# Patient Record
Sex: MALE | Race: WHITE | NOT HISPANIC OR LATINO | URBAN - METROPOLITAN AREA
[De-identification: names, ages, dates, MRNs, and addresses within clinical notes are randomized per-mention and may not be internally consistent; named-entity substitution may affect disease eponyms.]

---

## 2017-06-22 ENCOUNTER — OUTPATIENT (OUTPATIENT)
Dept: OUTPATIENT SERVICES | Facility: HOSPITAL | Age: 27
LOS: 1 days | Discharge: STILL ADMITTED OR ADMINISTRATIVE | End: 2017-06-22

## 2017-06-23 DIAGNOSIS — F32.1 MAJOR DEPRESSIVE DISORDER, SINGLE EPISODE, MODERATE: ICD-10-CM

## 2017-07-06 ENCOUNTER — INPATIENT (INPATIENT)
Facility: HOSPITAL | Age: 27
LOS: 7 days | Discharge: HOME | End: 2017-07-14
Attending: PSYCHIATRY & NEUROLOGY | Admitting: PSYCHIATRY & NEUROLOGY
Payer: COMMERCIAL

## 2017-07-06 VITALS
HEART RATE: 51 BPM | SYSTOLIC BLOOD PRESSURE: 114 MMHG | RESPIRATION RATE: 20 BRPM | TEMPERATURE: 98 F | OXYGEN SATURATION: 98 % | DIASTOLIC BLOOD PRESSURE: 64 MMHG

## 2017-07-06 DIAGNOSIS — F29 UNSPECIFIED PSYCHOSIS NOT DUE TO A SUBSTANCE OR KNOWN PHYSIOLOGICAL CONDITION: ICD-10-CM

## 2017-07-06 LAB
ALBUMIN SERPL ELPH-MCNC: 4.5 G/DL — SIGNIFICANT CHANGE UP (ref 3.3–5)
ALP SERPL-CCNC: 45 U/L — SIGNIFICANT CHANGE UP (ref 40–120)
ALT FLD-CCNC: 35 U/L — SIGNIFICANT CHANGE UP (ref 4–41)
AMPHET UR-MCNC: NEGATIVE — SIGNIFICANT CHANGE UP
APAP SERPL-MCNC: < 15 UG/ML — LOW (ref 15–25)
APPEARANCE UR: CLEAR — SIGNIFICANT CHANGE UP
AST SERPL-CCNC: 43 U/L — HIGH (ref 4–40)
BACTERIA # UR AUTO: SIGNIFICANT CHANGE UP
BARBITURATES MEASUREMENT: NEGATIVE — SIGNIFICANT CHANGE UP
BARBITURATES UR SCN-MCNC: NEGATIVE — SIGNIFICANT CHANGE UP
BASOPHILS # BLD AUTO: 0.05 K/UL — SIGNIFICANT CHANGE UP (ref 0–0.2)
BASOPHILS NFR BLD AUTO: 0.5 % — SIGNIFICANT CHANGE UP (ref 0–2)
BENZODIAZ SERPL-MCNC: NEGATIVE — SIGNIFICANT CHANGE UP
BENZODIAZ UR-MCNC: NEGATIVE — SIGNIFICANT CHANGE UP
BILIRUB SERPL-MCNC: 0.4 MG/DL — SIGNIFICANT CHANGE UP (ref 0.2–1.2)
BILIRUB UR-MCNC: NEGATIVE — SIGNIFICANT CHANGE UP
BLOOD UR QL VISUAL: NEGATIVE — SIGNIFICANT CHANGE UP
BUN SERPL-MCNC: 13 MG/DL — SIGNIFICANT CHANGE UP (ref 7–23)
CALCIUM SERPL-MCNC: 9.6 MG/DL — SIGNIFICANT CHANGE UP (ref 8.4–10.5)
CANNABINOIDS UR-MCNC: POSITIVE — SIGNIFICANT CHANGE UP
CHLORIDE SERPL-SCNC: 99 MMOL/L — SIGNIFICANT CHANGE UP (ref 98–107)
CO2 SERPL-SCNC: 28 MMOL/L — SIGNIFICANT CHANGE UP (ref 22–31)
COCAINE METAB.OTHER UR-MCNC: POSITIVE — SIGNIFICANT CHANGE UP
COLOR SPEC: YELLOW — SIGNIFICANT CHANGE UP
CREAT SERPL-MCNC: 1.12 MG/DL — SIGNIFICANT CHANGE UP (ref 0.5–1.3)
EOSINOPHIL # BLD AUTO: 0.18 K/UL — SIGNIFICANT CHANGE UP (ref 0–0.5)
EOSINOPHIL NFR BLD AUTO: 1.6 % — SIGNIFICANT CHANGE UP (ref 0–6)
ETHANOL BLD-MCNC: < 10 MG/DL — SIGNIFICANT CHANGE UP
GLUCOSE SERPL-MCNC: 103 MG/DL — HIGH (ref 70–99)
GLUCOSE UR-MCNC: NEGATIVE — SIGNIFICANT CHANGE UP
HCT VFR BLD CALC: 45.6 % — SIGNIFICANT CHANGE UP (ref 39–50)
HGB BLD-MCNC: 15.2 G/DL — SIGNIFICANT CHANGE UP (ref 13–17)
IMM GRANULOCYTES # BLD AUTO: 0.06 # — SIGNIFICANT CHANGE UP
IMM GRANULOCYTES NFR BLD AUTO: 0.5 % — SIGNIFICANT CHANGE UP (ref 0–1.5)
KETONES UR-MCNC: NEGATIVE — SIGNIFICANT CHANGE UP
LEUKOCYTE ESTERASE UR-ACNC: NEGATIVE — SIGNIFICANT CHANGE UP
LYMPHOCYTES # BLD AUTO: 2.64 K/UL — SIGNIFICANT CHANGE UP (ref 1–3.3)
LYMPHOCYTES # BLD AUTO: 23.8 % — SIGNIFICANT CHANGE UP (ref 13–44)
MCHC RBC-ENTMCNC: 29.8 PG — SIGNIFICANT CHANGE UP (ref 27–34)
MCHC RBC-ENTMCNC: 33.3 % — SIGNIFICANT CHANGE UP (ref 32–36)
MCV RBC AUTO: 89.4 FL — SIGNIFICANT CHANGE UP (ref 80–100)
METHADONE UR-MCNC: NEGATIVE — SIGNIFICANT CHANGE UP
MONOCYTES # BLD AUTO: 0.74 K/UL — SIGNIFICANT CHANGE UP (ref 0–0.9)
MONOCYTES NFR BLD AUTO: 6.7 % — SIGNIFICANT CHANGE UP (ref 2–14)
MUCOUS THREADS # UR AUTO: SIGNIFICANT CHANGE UP
NEUTROPHILS # BLD AUTO: 7.42 K/UL — HIGH (ref 1.8–7.4)
NEUTROPHILS NFR BLD AUTO: 66.9 % — SIGNIFICANT CHANGE UP (ref 43–77)
NITRITE UR-MCNC: NEGATIVE — SIGNIFICANT CHANGE UP
NRBC # FLD: 0 — SIGNIFICANT CHANGE UP
OPIATES UR-MCNC: NEGATIVE — SIGNIFICANT CHANGE UP
OXYCODONE UR-MCNC: NEGATIVE — SIGNIFICANT CHANGE UP
PCP UR-MCNC: NEGATIVE — SIGNIFICANT CHANGE UP
PH UR: 6 — SIGNIFICANT CHANGE UP (ref 4.6–8)
PLATELET # BLD AUTO: 176 K/UL — SIGNIFICANT CHANGE UP (ref 150–400)
PMV BLD: 10.9 FL — SIGNIFICANT CHANGE UP (ref 7–13)
POTASSIUM SERPL-MCNC: 3.8 MMOL/L — SIGNIFICANT CHANGE UP (ref 3.5–5.3)
POTASSIUM SERPL-SCNC: 3.8 MMOL/L — SIGNIFICANT CHANGE UP (ref 3.5–5.3)
PROT SERPL-MCNC: 7.4 G/DL — SIGNIFICANT CHANGE UP (ref 6–8.3)
PROT UR-MCNC: 10 — SIGNIFICANT CHANGE UP
RBC # BLD: 5.1 M/UL — SIGNIFICANT CHANGE UP (ref 4.2–5.8)
RBC # FLD: 12.5 % — SIGNIFICANT CHANGE UP (ref 10.3–14.5)
RBC CASTS # UR COMP ASSIST: SIGNIFICANT CHANGE UP (ref 0–?)
SALICYLATES SERPL-MCNC: < 5 MG/DL — LOW (ref 15–30)
SODIUM SERPL-SCNC: 141 MMOL/L — SIGNIFICANT CHANGE UP (ref 135–145)
SP GR SPEC: 1.03 — SIGNIFICANT CHANGE UP (ref 1–1.03)
TSH SERPL-MCNC: 1.18 UIU/ML — SIGNIFICANT CHANGE UP (ref 0.27–4.2)
UROBILINOGEN FLD QL: NORMAL E.U. — SIGNIFICANT CHANGE UP (ref 0.1–0.2)
WBC # BLD: 11.09 K/UL — HIGH (ref 3.8–10.5)
WBC # FLD AUTO: 11.09 K/UL — HIGH (ref 3.8–10.5)
WBC UR QL: SIGNIFICANT CHANGE UP (ref 0–?)

## 2017-07-06 PROCEDURE — 99285 EMERGENCY DEPT VISIT HI MDM: CPT

## 2017-07-06 RX ORDER — DIPHENHYDRAMINE HCL 50 MG
50 CAPSULE ORAL EVERY 6 HOURS
Qty: 0 | Refills: 0 | Status: DISCONTINUED | OUTPATIENT
Start: 2017-07-06 | End: 2017-07-14

## 2017-07-06 RX ORDER — HALOPERIDOL DECANOATE 100 MG/ML
5 INJECTION INTRAMUSCULAR ONCE
Qty: 0 | Refills: 0 | Status: DISCONTINUED | OUTPATIENT
Start: 2017-07-06 | End: 2017-07-14

## 2017-07-06 RX ORDER — DIPHENHYDRAMINE HCL 50 MG
50 CAPSULE ORAL ONCE
Qty: 0 | Refills: 0 | Status: DISCONTINUED | OUTPATIENT
Start: 2017-07-06 | End: 2017-07-14

## 2017-07-06 RX ORDER — HALOPERIDOL DECANOATE 100 MG/ML
5 INJECTION INTRAMUSCULAR EVERY 6 HOURS
Qty: 0 | Refills: 0 | Status: DISCONTINUED | OUTPATIENT
Start: 2017-07-06 | End: 2017-07-14

## 2017-07-06 RX ORDER — RISPERIDONE 4 MG/1
1 TABLET ORAL AT BEDTIME
Qty: 0 | Refills: 0 | Status: DISCONTINUED | OUTPATIENT
Start: 2017-07-06 | End: 2017-07-10

## 2017-07-06 RX ORDER — ESCITALOPRAM OXALATE 10 MG/1
10 TABLET, FILM COATED ORAL DAILY
Qty: 0 | Refills: 0 | Status: DISCONTINUED | OUTPATIENT
Start: 2017-07-06 | End: 2017-07-07

## 2017-07-06 RX ADMIN — Medication 2 MILLIGRAM(S): at 22:57

## 2017-07-06 RX ADMIN — RISPERIDONE 1 MILLIGRAM(S): 4 TABLET ORAL at 22:57

## 2017-07-06 NOTE — ED BEHAVIORAL HEALTH ASSESSMENT NOTE - HPI (INCLUDE ILLNESS QUALITY, SEVERITY, DURATION, TIMING, CONTEXT, MODIFYING FACTORS, ASSOCIATED SIGNS AND SYMPTOMS)
Patient is a 26 year old single man, with no children, currently unemployed and domiciled with his parents and brother in a private residence, recently accepted to graduate school, with no PMH, with prior psychiatric history of depression, no prior hospitalization, no prior suicide attempts, with prior substance use (marijuana, LSD, stimulants, alcohol), with no prior violence or legal problems, with no family history, was referred to ED and brought in by mother for psychotic symptoms.    Patient presents as a limited historian, tangential, with some impaired reasoning, mildly disorganized, reports to have been brought in by mother because of argument that occurred earlier this morning. Patient unable to provide information regarding reason for argument, stating that mother is "overreacting, adding "I'm fine." Reports to have been yelling, however denying aggression or violence with mother. Denies making homicidal threats. Denies homicidal ideation/intent/plan. Reports to have been stressed over employment matter and "poor decisions" he made in that regard. Reports turning down a good employment opportunity for a start up in [place] and now he is regretting it. Reports the offer is "no longer on the table," as it was last year. Reports to have been in [place] for 9 months and recently returned 01/2017. 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 Reports spending time at home "thinking about what to do next." Reports "multiple rejections from sales opportunities on [employer]." Reports putting pressure on self, leading to feelings of anxiety, excessive worrying about future and "what to do." Reports preservative thoughts about missed opportunities. Reports varying depressed mood, deny persistency. Reports periods of anhedonia, hopelessness, helplessness, worthlessness, however denying persistency of these symptoms. Reports difficulty concentration. Reports low energy and low motivation. Denies disturbances in sleep / appetite. Reports prior passive suicidal ideation of "rather being dead" however denies suicidal ideation/intent/plan. Denies manic symptoms including elevated mood, increased irritability, mood lability, distractibility, pressured speech, increase in goal-directed activity, or decreased need for sleep. Reports psychotic symptoms including auditory hallucinations, however could not specify content of auditory hallucination, reporting it usually experiencing it in the evening hours. Denies  paranoia, ideas of reference, thought insertion/broadcasting, or  visual/olfactory/tactile/gustatory hallucinations. Reports decline in overall function.     Collateral provided by mother, adding that patient has been acting more bizarre at home, talking to self (loudly), not making sense, perseverating on lost opportunities. Reports patient has been having delusions of grandeur, talking about himself being a top candidate for a company on [employer], however at this time, patient is disorganized, cannot care for self, let alone make appropriate decisions, with poor insight to his symptoms and having limited judgement. Patient's condition has been worsening, although recently starting treatment with Dr. Rollins at [facility] and is supposed to start Early Intervention Program 07/13/2017. Patient is medication compliance with Lexapro 10 mg daily and Risperidone 5 mg. Reports patients increased disorganized state and worsening of bizarre nonsensical behaviors is concerning, requesting in-patient hospitalization for safety and stabilization. Patient is a 26 year old single man, with no children, currently unemployed and domiciled with his parents and brother in a private residence, recently accepted to graduate school, with no PMH, with prior psychiatric history of depression and anxiety, no prior hospitalization, no prior suicide attempts, with prior substance use (marijuana, LSD, stimulants, alcohol), with no prior violence or legal problems, with no family history, was referred to ED and brought in by mother for psychotic symptoms.    Patient presents as a limited historian, tangential, with some impaired reasoning, mildly disorganized, reports to have been brought in by mother because of argument that occurred earlier this morning. Patient unable to provide information regarding reason for argument, stating that mother is "overreacting, adding "I'm fine." Reports to have been yelling, however denying aggression or violence with mother. Denies making homicidal threats. Denies homicidal ideation/intent/plan. Reports to have been stressed over employment matter and "poor decisions" he made in that regard. Reports turning down a good employment opportunity for a start up in [place] and now he is regretting it. Reports the offer is "no longer on the table," as it was last year. Reports to have been in [place] for 9 months and recently returned 01/2017. 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 Reports spending time at home "thinking about what to do next." Reports "multiple rejections from sales opportunities on [employer]." Reports putting pressure on self, leading to feelings of anxiety, excessive worrying about future and "what to do." Reports preservative thoughts about missed opportunities. Reports varying depressed mood, deny persistency. Reports periods of anhedonia, hopelessness, helplessness, worthlessness, however denying persistency of these symptoms. Reports difficulty concentration. Reports low energy and low motivation. Denies disturbances in sleep / appetite. Reports prior passive suicidal ideation of "rather being dead" however denies suicidal ideation/intent/plan. Denies manic symptoms including elevated mood, increased irritability, mood lability, distractibility, pressured speech, increase in goal-directed activity, or decreased need for sleep. Reports psychotic symptoms including auditory hallucinations, however could not specify content of auditory hallucination, reporting it usually experiencing it in the evening hours. Denies  paranoia, ideas of reference, thought insertion/broadcasting, or  visual/olfactory/tactile/gustatory hallucinations. Reports decline in overall function. Reports future orientation - wanting to find employment of go to graduate school.    Collateral provided by mother, adding that patient has been acting more bizarre at home, talking to self (loudly), not making sense, perseverating on lost opportunities. Patient has been complaining of hearing voices over the last few weeks, however does not believe to be commanding in nature. Reports patient has been having delusions of grandeur, talking about himself being a top candidate for a company on [employer], however at this time, patient is disorganized, cannot care for self, let alone make appropriate decisions, with poor insight to his symptoms and having limited judgement. Denies prior suicide attempt. Reports prior suicidal statement however denies patient having conviction in those statements. Denies prior self-injurious behaviors. Denies prior in-patient hospitalization. Patient's condition has been worsening, although recently starting treatment with Dr. Rollins at [facility] and is supposed to start Early Intervention Program 07/13/2017. Patient is medication compliant with Lexapro 10 mg daily and Risperidone 5 mg. Reports patients increased disorganized state and worsening of bizarre nonsensical behaviors is concerning, requesting in-patient hospitalization for safety and stabilization. Patient is a 26 year old single man, with no children, currently unemployed and domiciled with his parents and brother in a private residence, recently accepted to graduate school, with no PMH, with prior psychiatric history of depression and anxiety, no prior hospitalization, no prior suicide attempts, with prior substance use (marijuana, LSD, stimulants, alcohol), with no prior violence or legal problems, with no family history, was referred to ED and brought in by mother for psychotic symptoms.    Patient presents as a limited historian, tangential, with some impaired reasoning, mildly disorganized, reports to have been brought in by mother because of argument that occurred earlier this morning. Patient unable to provide information regarding reason for argument, stating that mother is "overreacting, adding "I'm fine." Reports to have been yelling, however denying aggression or violence with mother. Denies making homicidal threats. Denies homicidal ideation/intent/plan. Reports to have been stressed over employment matter and "poor decisions" he made in that regard. Reports turning down a good employment opportunity for a start up in [place] and now he is regretting it. Reports the offer is "no longer on the table," as it was last year. Reports to have been in [place] for 9 months and recently returned 01/2017. 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 Reports spending time at home "thinking about what to do next." Reports "multiple rejections from sales opportunities on [employer]." Reports putting pressure on self, leading to feelings of anxiety, excessive worrying about future and "what to do." Reports preservative thoughts about missed opportunities. Reports varying depressed mood, deny persistency. Reports periods of anhedonia, hopelessness, helplessness, worthlessness, however denying persistency of these symptoms. Reports difficulty concentration. Reports low energy and low motivation. Denies disturbances in sleep / appetite. Reports prior passive suicidal ideation of "rather being dead" however denies suicidal ideation/intent/plan. Denies manic symptoms including elevated mood, increased irritability, mood lability, distractibility, pressured speech, increase in goal-directed activity, or decreased need for sleep. Reports psychotic symptoms including auditory hallucinations, however could not specify content of auditory hallucination, reporting it usually experiencing it in the evening hours. Denies  paranoia, ideas of reference, thought insertion/broadcasting, or  visual/olfactory/tactile/gustatory hallucinations. Reports decline in overall function. Reports future orientation - wanting to find employment of go to graduate school.    Collateral provided by mother, adding that patient has been acting more bizarre at home, talking to self (loudly), not making sense, perseverating on lost opportunities. Patient has been complaining of hearing voices over the last few weeks, however does not believe to be commanding in nature. Reports patient has been having delusions of grandeur, talking about himself being a top candidate for a company on [employer], however at this time, patient is disorganized, cannot care for self, let alone make appropriate decisions, with poor insight to his symptoms and having limited judgement. Denies prior suicide attempt. Reports prior suicidal statement however denies patient having conviction in those statements. Denies prior self-injurious behaviors. Denies prior in-patient hospitalization. Patient's condition has been worsening, although recently starting treatment with Dr. Rollins at [facility] and is supposed to start Early Intervention Program 07/13/2017. Patient is medication compliant with Lexapro 10 mg daily and Risperidone 1 mg. Reports patients increased disorganized state and worsening of bizarre nonsensical behaviors is concerning, requesting in-patient hospitalization for safety and stabilization.

## 2017-07-06 NOTE — ED PROVIDER NOTE - CHPI ED SYMPTOMS NEG
no agitation/no homicidal/no hallucinations/no confusion/no weight loss/no disorientation/no suicidal/no weakness/no change in level of consciousness/no paranoia

## 2017-07-06 NOTE — ED BEHAVIORAL HEALTH ASSESSMENT NOTE - UNABLE TO CARE FOR SELF DETAILS
poor insight, limited judgement, psychotic symptoms with disorganized thought process and intermittent experiences auditory hallucination

## 2017-07-06 NOTE — ED BEHAVIORAL HEALTH ASSESSMENT NOTE - DESCRIPTION (FIRST USE, LAST USE, QUANTITY, FREQUENCY, DURATION)
LSD (age 22)- had psychotic episode and was sent to ED; Stimulant (age 23) - got it from friend at last year of undergraduate college occasional use

## 2017-07-06 NOTE — ED BEHAVIORAL HEALTH ASSESSMENT NOTE - OTHER
mother auditory hallucinations mildly disorganized mild looseness / derailment delusions of grandeur recent history of auditory hallucination however denying current auditory hallucination

## 2017-07-06 NOTE — ED PROVIDER NOTE - MEDICAL DECISION MAKING DETAILS
25y/o Male hx of anxiety and depression brought to ed for psych eval 2/2 delusion as per his parents.  Pt is well appearing w/o visible signs of physical injuries on exam, head NCAT, no C-spine tend, CN II- XII intact, ambulating w/ steady gait, pulm- bl cta, CV- S1S2- GI- abd sft, nt,nd,no rebound or guarding-  labs and ECG pending for medical clearance. 25y/o Male hx of anxiety and depression brought to ed for psych eval 2/2 delusion as per his parents.  Pt is well appearing and robust habitus,  w/o visible signs of physical injuries on exam, head NCAT, no C-spine tend, CN II- XII intact, ambulating w/ steady gait, pulm- bl cta, CV- S1S2- GI- abd sft, nt,nd,no rebound or guarding-  labs and ECG pending for medical clearance.  labs unrevealing, sinus amando at 47 w/o cp or sob, no dizziness or lightheadedness- Usually runs 6 miles/10 minutes.  Medically clear for psych eval 25y/o Male hx of anxiety and depression brought to ed for psych eval 2/2 delusion as per his parents.  Pt is well appearing and robust habitus,  w/o visible signs of physical injuries on exam, head NCAT, no C-spine tend, CN II- XII intact, ambulating w/ steady gait, pulm- bl cta, CV- S1S2- GI- abd sft, nt,nd,no rebound or guarding-  labs and ECG pending for medical clearance.  labs unrevealing, sinus amando at 47 w/o cp or sob, no dizziness or lightheadedness- Usually runs 6 miles/hr.  Medically cleared for psych disposition.
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
## 2017-07-06 NOTE — ED BEHAVIORAL HEALTH ASSESSMENT NOTE - DESCRIPTION
Patient was calm and cooperative in the ED and did not exhibit any aggression. Patient did not require any PRN medications or any physical restraints. Denies As per HPI

## 2017-07-06 NOTE — ED BEHAVIORAL HEALTH ASSESSMENT NOTE - SUMMARY
Patient is a 26 year old single man, with no children, currently unemployed and domiciled with his parents and brother in a private residence, recently accepted to graduate school, with no PMH, with prior psychiatric history of depression and anxiety, no prior hospitalization, no prior suicide attempts, with prior substance use (marijuana, LSD, stimulants, alcohol), with no prior violence or legal problems, with no family history, was referred to ED and brought in by mother for psychotic symptoms.    Patient presents with psychotic symptoms that are worsening, representing a change from baseline from which the patient cannot be reasonably expected to improve with current level of care. The patient presents with risk requiring inpatient psychiatric hospitalization for safety and stabilization.

## 2017-07-06 NOTE — ED PROVIDER NOTE - OBJECTIVE STATEMENT
The patient is a 26y Male hx of anxiety and depression brought to ed for psych eval 2/2 delusion as per his parents.  Pt denies recent injuries, trauma or falls, no headache, back or neck pain, no abd/flank pain, no uti/urinary symptoms, nausea or vomiting, no fever or chills, no cp or sob, no palpitations or diaphoresis.  Pt reported smoking pot and had a few beers for 4th of July.  Denies SI/HI, no AVH.

## 2017-07-06 NOTE — ED BEHAVIORAL HEALTH ASSESSMENT NOTE - PSYCHIATRIC ISSUES AND PLAN (INCLUDE STANDING AND PRN MEDICATION)
Haldol 5 mg PO/IM, Ativan 2 mg PO/IM, and Benadryl 50 mg PO/IM PRN Q6H for agitation. Lexapro 10 mg once daily. Risperidone 1 mg at bedtime. Haldol 5 mg PO/IM, Ativan 2 mg PO/IM, and Benadryl 50 mg PO/IM PRN Q6H for agitation.

## 2017-07-06 NOTE — ED BEHAVIORAL HEALTH ASSESSMENT NOTE - RISK ASSESSMENT
Patient presents a risk for harm to self, with risk factors including poor insight, poor judgement, prior substance use, mood lability, decline in overall function, unable to care for self and make appropriate decisions, delusions of grandeur, disorganized, of which protective factors of supportive family, no current suicidal ideation are not sufficient barriers to patient's self harm, summating patient as a significant risk for harm, regarding inability to care for self, requiring in-patient hospitalization for safety and stabilization.

## 2017-07-06 NOTE — ED BEHAVIORAL HEALTH ASSESSMENT NOTE - DETAILS
prior fleeting passive suicidal ideation with no prior intent / plan. No prior suicide attempt. JULIUS Mother notified JULIUS Andrade

## 2017-07-07 PROCEDURE — 99223 1ST HOSP IP/OBS HIGH 75: CPT

## 2017-07-07 RX ORDER — HYDROXYZINE HCL 10 MG
50 TABLET ORAL EVERY 6 HOURS
Qty: 0 | Refills: 0 | Status: DISCONTINUED | OUTPATIENT
Start: 2017-07-07 | End: 2017-07-07

## 2017-07-07 RX ORDER — ESCITALOPRAM OXALATE 10 MG/1
5 TABLET, FILM COATED ORAL DAILY
Qty: 0 | Refills: 0 | Status: DISCONTINUED | OUTPATIENT
Start: 2017-07-07 | End: 2017-07-08

## 2017-07-07 RX ORDER — HYDROXYZINE HCL 10 MG
50 TABLET ORAL EVERY 6 HOURS
Qty: 0 | Refills: 0 | Status: DISCONTINUED | OUTPATIENT
Start: 2017-07-07 | End: 2017-07-14

## 2017-07-07 RX ORDER — MIRTAZAPINE 45 MG/1
15 TABLET, ORALLY DISINTEGRATING ORAL AT BEDTIME
Qty: 0 | Refills: 0 | Status: DISCONTINUED | OUTPATIENT
Start: 2017-07-07 | End: 2017-07-08

## 2017-07-07 RX ADMIN — ESCITALOPRAM OXALATE 10 MILLIGRAM(S): 10 TABLET, FILM COATED ORAL at 08:48

## 2017-07-07 RX ADMIN — MIRTAZAPINE 15 MILLIGRAM(S): 45 TABLET, ORALLY DISINTEGRATING ORAL at 21:00

## 2017-07-07 RX ADMIN — RISPERIDONE 1 MILLIGRAM(S): 4 TABLET ORAL at 21:00

## 2017-07-08 PROCEDURE — 99232 SBSQ HOSP IP/OBS MODERATE 35: CPT

## 2017-07-08 RX ORDER — MIRTAZAPINE 45 MG/1
30 TABLET, ORALLY DISINTEGRATING ORAL AT BEDTIME
Qty: 0 | Refills: 0 | Status: DISCONTINUED | OUTPATIENT
Start: 2017-07-08 | End: 2017-07-14

## 2017-07-08 RX ADMIN — ESCITALOPRAM OXALATE 5 MILLIGRAM(S): 10 TABLET, FILM COATED ORAL at 08:38

## 2017-07-08 RX ADMIN — MIRTAZAPINE 30 MILLIGRAM(S): 45 TABLET, ORALLY DISINTEGRATING ORAL at 20:38

## 2017-07-08 RX ADMIN — RISPERIDONE 1 MILLIGRAM(S): 4 TABLET ORAL at 20:54

## 2017-07-09 PROCEDURE — 99232 SBSQ HOSP IP/OBS MODERATE 35: CPT

## 2017-07-09 RX ADMIN — MIRTAZAPINE 30 MILLIGRAM(S): 45 TABLET, ORALLY DISINTEGRATING ORAL at 20:21

## 2017-07-09 RX ADMIN — RISPERIDONE 1 MILLIGRAM(S): 4 TABLET ORAL at 20:21

## 2017-07-09 RX ADMIN — Medication 50 MILLIGRAM(S): at 20:22

## 2017-07-10 PROCEDURE — 99232 SBSQ HOSP IP/OBS MODERATE 35: CPT

## 2017-07-10 RX ORDER — RISPERIDONE 4 MG/1
2 TABLET ORAL AT BEDTIME
Qty: 0 | Refills: 0 | Status: DISCONTINUED | OUTPATIENT
Start: 2017-07-10 | End: 2017-07-13

## 2017-07-10 RX ADMIN — MIRTAZAPINE 30 MILLIGRAM(S): 45 TABLET, ORALLY DISINTEGRATING ORAL at 20:32

## 2017-07-10 RX ADMIN — RISPERIDONE 2 MILLIGRAM(S): 4 TABLET ORAL at 20:32

## 2017-07-11 PROCEDURE — 99232 SBSQ HOSP IP/OBS MODERATE 35: CPT | Mod: 25

## 2017-07-11 RX ADMIN — MIRTAZAPINE 30 MILLIGRAM(S): 45 TABLET, ORALLY DISINTEGRATING ORAL at 20:54

## 2017-07-11 RX ADMIN — RISPERIDONE 2 MILLIGRAM(S): 4 TABLET ORAL at 20:54

## 2017-07-12 PROCEDURE — 90853 GROUP PSYCHOTHERAPY: CPT

## 2017-07-12 RX ORDER — BUPROPION HYDROCHLORIDE 150 MG/1
150 TABLET, EXTENDED RELEASE ORAL DAILY
Qty: 0 | Refills: 0 | Status: DISCONTINUED | OUTPATIENT
Start: 2017-07-12 | End: 2017-07-14

## 2017-07-12 RX ADMIN — MIRTAZAPINE 30 MILLIGRAM(S): 45 TABLET, ORALLY DISINTEGRATING ORAL at 20:33

## 2017-07-12 RX ADMIN — RISPERIDONE 2 MILLIGRAM(S): 4 TABLET ORAL at 20:33

## 2017-07-13 PROCEDURE — 99232 SBSQ HOSP IP/OBS MODERATE 35: CPT

## 2017-07-13 RX ORDER — BUPROPION HYDROCHLORIDE 150 MG/1
2 TABLET, EXTENDED RELEASE ORAL
Qty: 30 | Refills: 0 | COMMUNITY
Start: 2017-07-13 | End: 2017-08-12

## 2017-07-13 RX ORDER — MIRTAZAPINE 45 MG/1
1 TABLET, ORALLY DISINTEGRATING ORAL
Qty: 30 | Refills: 0 | OUTPATIENT
Start: 2017-07-13 | End: 2017-08-12

## 2017-07-13 RX ORDER — RISPERIDONE 4 MG/1
2 TABLET ORAL AT BEDTIME
Qty: 0 | Refills: 0 | Status: DISCONTINUED | OUTPATIENT
Start: 2017-07-13 | End: 2017-07-14

## 2017-07-13 RX ORDER — BUPROPION HYDROCHLORIDE 150 MG/1
1 TABLET, EXTENDED RELEASE ORAL
Qty: 30 | Refills: 0 | OUTPATIENT
Start: 2017-07-13 | End: 2017-08-12

## 2017-07-13 RX ORDER — RISPERIDONE 4 MG/1
1 TABLET ORAL
Qty: 0 | Refills: 0 | COMMUNITY

## 2017-07-13 RX ORDER — RISPERIDONE 4 MG/1
1.5 TABLET ORAL
Qty: 30 | Refills: 0 | COMMUNITY
Start: 2017-07-13 | End: 2017-08-12

## 2017-07-13 RX ORDER — RISPERIDONE 4 MG/1
2.5 TABLET ORAL AT BEDTIME
Qty: 0 | Refills: 0 | Status: DISCONTINUED | OUTPATIENT
Start: 2017-07-13 | End: 2017-07-13

## 2017-07-13 RX ORDER — ESCITALOPRAM OXALATE 10 MG/1
1 TABLET, FILM COATED ORAL
Qty: 0 | Refills: 0 | COMMUNITY

## 2017-07-13 RX ORDER — RISPERIDONE 4 MG/1
1 TABLET ORAL
Qty: 30 | Refills: 0 | OUTPATIENT
Start: 2017-07-13 | End: 2017-08-12

## 2017-07-13 RX ADMIN — BUPROPION HYDROCHLORIDE 150 MILLIGRAM(S): 150 TABLET, EXTENDED RELEASE ORAL at 09:54

## 2017-07-13 RX ADMIN — MIRTAZAPINE 30 MILLIGRAM(S): 45 TABLET, ORALLY DISINTEGRATING ORAL at 20:38

## 2017-07-13 RX ADMIN — RISPERIDONE 2 MILLIGRAM(S): 4 TABLET ORAL at 20:38

## 2017-07-14 VITALS — DIASTOLIC BLOOD PRESSURE: 61 MMHG | TEMPERATURE: 98 F | SYSTOLIC BLOOD PRESSURE: 134 MMHG | HEART RATE: 67 BPM

## 2017-07-14 RX ADMIN — BUPROPION HYDROCHLORIDE 150 MILLIGRAM(S): 150 TABLET, EXTENDED RELEASE ORAL at 10:05

## 2017-07-17 ENCOUNTER — OUTPATIENT (OUTPATIENT)
Dept: OUTPATIENT SERVICES | Facility: HOSPITAL | Age: 27
LOS: 1 days | Discharge: HOME | End: 2017-07-17

## 2017-07-18 DIAGNOSIS — F33.3 MAJOR DEPRESSIVE DISORDER, RECURRENT, SEVERE WITH PSYCHOTIC SYMPTOMS: ICD-10-CM

## 2017-08-11 ENCOUNTER — EMERGENCY (EMERGENCY)
Facility: HOSPITAL | Age: 27
LOS: 1 days | Discharge: HOME | End: 2017-08-11
Admitting: EMERGENCY MEDICINE
Payer: COMMERCIAL

## 2017-08-11 VITALS
OXYGEN SATURATION: 100 % | SYSTOLIC BLOOD PRESSURE: 109 MMHG | RESPIRATION RATE: 18 BRPM | TEMPERATURE: 97 F | DIASTOLIC BLOOD PRESSURE: 76 MMHG | HEART RATE: 90 BPM

## 2017-08-11 PROCEDURE — 99284 EMERGENCY DEPT VISIT MOD MDM: CPT
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
## 2017-08-11 NOTE — ED PROVIDER NOTE - MEDICAL DECISION MAKING DETAILS
25 y/o M hx Bipolar   No evidence of physical injuries, broken skin or deformities.  Medical evaluation performed. There is no clinical evidence of intoxication or any acute medical problem requiring immediate intervention. To follow up with [facility] out-patient clinic
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
## 2017-08-12 PROBLEM — F41.9 ANXIETY DISORDER, UNSPECIFIED: Chronic | Status: ACTIVE | Noted: 2017-07-06

## 2017-08-17 ENCOUNTER — INPATIENT (INPATIENT)
Facility: HOSPITAL | Age: 27
LOS: 7 days | Discharge: HOME | End: 2017-08-25
Attending: PSYCHIATRY & NEUROLOGY | Admitting: PSYCHIATRY & NEUROLOGY
Payer: COMMERCIAL

## 2017-08-17 VITALS — HEIGHT: 74 IN | TEMPERATURE: 206 F | WEIGHT: 214.29 LBS | HEART RATE: 70 BPM | RESPIRATION RATE: 18 BRPM

## 2017-08-17 DIAGNOSIS — F32.9 MAJOR DEPRESSIVE DISORDER, SINGLE EPISODE, UNSPECIFIED: ICD-10-CM

## 2017-08-17 LAB
ALBUMIN SERPL ELPH-MCNC: 4.6 G/DL — SIGNIFICANT CHANGE UP (ref 3.3–5)
ALP SERPL-CCNC: 47 U/L — SIGNIFICANT CHANGE UP (ref 40–120)
ALT FLD-CCNC: 25 U/L — SIGNIFICANT CHANGE UP (ref 4–41)
AMPHET UR-MCNC: NEGATIVE — SIGNIFICANT CHANGE UP
APAP SERPL-MCNC: < 15 UG/ML — LOW (ref 15–25)
APPEARANCE UR: CLEAR — SIGNIFICANT CHANGE UP
AST SERPL-CCNC: 24 U/L — SIGNIFICANT CHANGE UP (ref 4–40)
BARBITURATES MEASUREMENT: NEGATIVE — SIGNIFICANT CHANGE UP
BARBITURATES UR SCN-MCNC: NEGATIVE — SIGNIFICANT CHANGE UP
BASOPHILS # BLD AUTO: 0.04 K/UL — SIGNIFICANT CHANGE UP (ref 0–0.2)
BASOPHILS NFR BLD AUTO: 0.4 % — SIGNIFICANT CHANGE UP (ref 0–2)
BENZODIAZ SERPL-MCNC: NEGATIVE — SIGNIFICANT CHANGE UP
BENZODIAZ UR-MCNC: NEGATIVE — SIGNIFICANT CHANGE UP
BILIRUB SERPL-MCNC: 0.3 MG/DL — SIGNIFICANT CHANGE UP (ref 0.2–1.2)
BILIRUB UR-MCNC: NEGATIVE — SIGNIFICANT CHANGE UP
BLOOD UR QL VISUAL: NEGATIVE — SIGNIFICANT CHANGE UP
BUN SERPL-MCNC: 11 MG/DL — SIGNIFICANT CHANGE UP (ref 7–23)
CALCIUM SERPL-MCNC: 9.6 MG/DL — SIGNIFICANT CHANGE UP (ref 8.4–10.5)
CANNABINOIDS UR-MCNC: POSITIVE — SIGNIFICANT CHANGE UP
CHLORIDE SERPL-SCNC: 103 MMOL/L — SIGNIFICANT CHANGE UP (ref 98–107)
CO2 SERPL-SCNC: 28 MMOL/L — SIGNIFICANT CHANGE UP (ref 22–31)
COCAINE METAB.OTHER UR-MCNC: NEGATIVE — SIGNIFICANT CHANGE UP
COLOR SPEC: YELLOW — SIGNIFICANT CHANGE UP
CREAT SERPL-MCNC: 1.22 MG/DL — SIGNIFICANT CHANGE UP (ref 0.5–1.3)
EOSINOPHIL # BLD AUTO: 0.27 K/UL — SIGNIFICANT CHANGE UP (ref 0–0.5)
EOSINOPHIL NFR BLD AUTO: 2.4 % — SIGNIFICANT CHANGE UP (ref 0–6)
ETHANOL BLD-MCNC: < 10 MG/DL — SIGNIFICANT CHANGE UP
GLUCOSE SERPL-MCNC: 79 MG/DL — SIGNIFICANT CHANGE UP (ref 70–99)
GLUCOSE UR-MCNC: NEGATIVE — SIGNIFICANT CHANGE UP
HCT VFR BLD CALC: 46.5 % — SIGNIFICANT CHANGE UP (ref 39–50)
HGB BLD-MCNC: 15.3 G/DL — SIGNIFICANT CHANGE UP (ref 13–17)
HYALINE CASTS # UR AUTO: SIGNIFICANT CHANGE UP (ref 0–?)
IMM GRANULOCYTES # BLD AUTO: 0.03 # — SIGNIFICANT CHANGE UP
IMM GRANULOCYTES NFR BLD AUTO: 0.3 % — SIGNIFICANT CHANGE UP (ref 0–1.5)
KETONES UR-MCNC: NEGATIVE — SIGNIFICANT CHANGE UP
LEUKOCYTE ESTERASE UR-ACNC: NEGATIVE — SIGNIFICANT CHANGE UP
LYMPHOCYTES # BLD AUTO: 2.46 K/UL — SIGNIFICANT CHANGE UP (ref 1–3.3)
LYMPHOCYTES # BLD AUTO: 22.2 % — SIGNIFICANT CHANGE UP (ref 13–44)
MCHC RBC-ENTMCNC: 29.4 PG — SIGNIFICANT CHANGE UP (ref 27–34)
MCHC RBC-ENTMCNC: 32.9 % — SIGNIFICANT CHANGE UP (ref 32–36)
MCV RBC AUTO: 89.3 FL — SIGNIFICANT CHANGE UP (ref 80–100)
METHADONE UR-MCNC: NEGATIVE — SIGNIFICANT CHANGE UP
MONOCYTES # BLD AUTO: 0.72 K/UL — SIGNIFICANT CHANGE UP (ref 0–0.9)
MONOCYTES NFR BLD AUTO: 6.5 % — SIGNIFICANT CHANGE UP (ref 2–14)
MUCOUS THREADS # UR AUTO: SIGNIFICANT CHANGE UP
NEUTROPHILS # BLD AUTO: 7.54 K/UL — HIGH (ref 1.8–7.4)
NEUTROPHILS NFR BLD AUTO: 68.2 % — SIGNIFICANT CHANGE UP (ref 43–77)
NITRITE UR-MCNC: NEGATIVE — SIGNIFICANT CHANGE UP
NRBC # FLD: 0 — SIGNIFICANT CHANGE UP
OPIATES UR-MCNC: NEGATIVE — SIGNIFICANT CHANGE UP
OXYCODONE UR-MCNC: NEGATIVE — SIGNIFICANT CHANGE UP
PCP UR-MCNC: NEGATIVE — SIGNIFICANT CHANGE UP
PH UR: 6.5 — SIGNIFICANT CHANGE UP (ref 4.6–8)
PLATELET # BLD AUTO: 178 K/UL — SIGNIFICANT CHANGE UP (ref 150–400)
PMV BLD: 10.4 FL — SIGNIFICANT CHANGE UP (ref 7–13)
POTASSIUM SERPL-MCNC: 4 MMOL/L — SIGNIFICANT CHANGE UP (ref 3.5–5.3)
POTASSIUM SERPL-SCNC: 4 MMOL/L — SIGNIFICANT CHANGE UP (ref 3.5–5.3)
PROT SERPL-MCNC: 7.6 G/DL — SIGNIFICANT CHANGE UP (ref 6–8.3)
PROT UR-MCNC: 20 — SIGNIFICANT CHANGE UP
RBC # BLD: 5.21 M/UL — SIGNIFICANT CHANGE UP (ref 4.2–5.8)
RBC # FLD: 12 % — SIGNIFICANT CHANGE UP (ref 10.3–14.5)
RBC CASTS # UR COMP ASSIST: SIGNIFICANT CHANGE UP (ref 0–?)
SALICYLATES SERPL-MCNC: < 5 MG/DL — LOW (ref 15–30)
SODIUM SERPL-SCNC: 144 MMOL/L — SIGNIFICANT CHANGE UP (ref 135–145)
SP GR SPEC: 1.03 — SIGNIFICANT CHANGE UP (ref 1–1.03)
SQUAMOUS # UR AUTO: SIGNIFICANT CHANGE UP
TSH SERPL-MCNC: 0.78 UIU/ML — SIGNIFICANT CHANGE UP (ref 0.27–4.2)
UROBILINOGEN FLD QL: NORMAL E.U. — SIGNIFICANT CHANGE UP (ref 0.1–0.2)
WBC # BLD: 11.06 K/UL — HIGH (ref 3.8–10.5)
WBC # FLD AUTO: 11.06 K/UL — HIGH (ref 3.8–10.5)
WBC UR QL: SIGNIFICANT CHANGE UP (ref 0–?)

## 2017-08-17 PROCEDURE — 99285 EMERGENCY DEPT VISIT HI MDM: CPT

## 2017-08-17 RX ORDER — CLONAZEPAM 1 MG
1 TABLET ORAL AT BEDTIME
Qty: 0 | Refills: 0 | Status: DISCONTINUED | OUTPATIENT
Start: 2017-08-17 | End: 2017-08-18

## 2017-08-17 RX ORDER — RISPERIDONE 4 MG/1
3 TABLET ORAL AT BEDTIME
Qty: 0 | Refills: 0 | Status: DISCONTINUED | OUTPATIENT
Start: 2017-08-17 | End: 2017-08-18

## 2017-08-17 RX ORDER — DEXTROAMPHETAMINE SACCHARATE, AMPHETAMINE ASPARTATE, DEXTROAMPHETAMINE SULFATE AND AMPHETAMINE SULFATE 1.875; 1.875; 1.875; 1.875 MG/1; MG/1; MG/1; MG/1
1 TABLET ORAL
Qty: 0 | Refills: 0 | COMMUNITY

## 2017-08-17 RX ORDER — MIRTAZAPINE 45 MG/1
30 TABLET, ORALLY DISINTEGRATING ORAL AT BEDTIME
Qty: 0 | Refills: 0 | Status: DISCONTINUED | OUTPATIENT
Start: 2017-08-17 | End: 2017-08-25

## 2017-08-17 RX ADMIN — Medication 1 MILLIGRAM(S): at 22:04

## 2017-08-17 RX ADMIN — MIRTAZAPINE 30 MILLIGRAM(S): 45 TABLET, ORALLY DISINTEGRATING ORAL at 22:04

## 2017-08-17 RX ADMIN — Medication 1 MILLIGRAM(S): at 18:19

## 2017-08-17 RX ADMIN — RISPERIDONE 3 MILLIGRAM(S): 4 TABLET ORAL at 22:05

## 2017-08-17 NOTE — ED BEHAVIORAL HEALTH ASSESSMENT NOTE - SUMMARY
26 year old single man, with no children, currently unemployed and domiciled with his parents and brother in a private residence, enrolled in graduate school for the fall at [employer], history of MDD with psychotic features, patient at the [facility] with Dr. Miller, hospitalized in July 2017 for psychotic depression, no prior suicide attempts, no violence hx, no legal problems, history of substance abuse in past, recently denies any ETOH use, denies DTs history, BIB mother for evaluation of depression & suicidality.  In ED, patient reports feeling depressed, overwhelmed, anxious & reports multiple suicidal plans. He is expressing that he feels overwhelmed & in ED requires PRN medication for agitation. Given severity of symptoms, this patient is at risk for harm & will benefit from an inpatient psychiatric hospitalization for safety and stabilization.

## 2017-08-17 NOTE — ED BEHAVIORAL HEALTH ASSESSMENT NOTE - SUBSTANCE ISSUES AND PLAN (INCLUDE STANDING AND PRN MEDICATION)
none, patient denies regular use of alcohol and is not known to use opiates. No history of withdrawal. At low risk for withdrawal.

## 2017-08-17 NOTE — ED PROVIDER NOTE - OBJECTIVE STATEMENT
Michael: 26 M, depression, perseverating on regret about decisions he made that "sabotaged" his career. Has thoughts to hurt self by taking pills. Hospitalized recently for depression. Lives at home w/ parents. Takes Bupropion in daytime and antidepressant at night.

## 2017-08-17 NOTE — ED BEHAVIORAL HEALTH ASSESSMENT NOTE - CASE SUMMARY
26 year old single man, with no children, currently unemployed and domiciled with his parents and brother in a private residence, enrolled in graduate school for the fall at [employer], history of MDD with psychotic features, patient at the [facility] program with Dr. Miller, hospitalized in July 2017 for psychotic depression, no prior suicide attempts, no violence hx, no legal problems, history of substance abuse in past, recently denies any ETOH use, denies DTs history, BIB mother for evaluation of depression & suicidality.  In ED, patient reports feeling depressed, overwhelmed, anxious & reports multiple suicidal plans. He is expressing that he feels overwhelmed & in ED requires PRN medication for agitation. Given severity of symptoms, this patient is at risk for harm & will benefit from an inpatient psychiatric hospitalization for safety and stabilization.  Admit to 1 [facility] unit on a 9.13 voluntary legal status.  No need for constant observation in a locked, secured setting.  Transportation to unit accompanied by security.

## 2017-08-17 NOTE — ED BEHAVIORAL HEALTH ASSESSMENT NOTE - DESCRIPTION (FIRST USE, LAST USE, QUANTITY, FREQUENCY, DURATION)
occasional use, denies any regular use or recent use occasional use, toxicology is positive LSD (age 22)- had psychotic episode and was sent to ED; Stimulant (age 23) - got it from friend at last year of undergraduate college history of use in past, positive toxicology at last visit

## 2017-08-17 NOTE — ED BEHAVIORAL HEALTH ASSESSMENT NOTE - OTHER PAST PSYCHIATRIC HISTORY (INCLUDE DETAILS REGARDING ONSET, COURSE OF ILLNESS, INPATIENT/OUTPATIENT TREATMENT)
Recent admission for psychosis, in outpatient treatment at the [facility] with Dr. Miller

## 2017-08-17 NOTE — ED BEHAVIORAL HEALTH ASSESSMENT NOTE - SUICIDE RISK FACTORS
Substance abuse/dependence/Unable to engage in safety planning/Highly impulsive behavior/Agitation/severe anxiety/Hopelessness/Mood episode/Access to means (pills, firearms, etc.)

## 2017-08-17 NOTE — ED BEHAVIORAL HEALTH NOTE - BEHAVIORAL HEALTH NOTE
Writer spoke with patient’s mother, Jessica Galvan, home [phone], cell [phone], in the [facility] ED, for collateral information, where she reported the following:    Patient resides with both parents and his younger brother. He has had one past IP episode, at [facility] in June of this year. He is in treatment with Dr. Miller of the [facility]. Today the informant suggested that the patient call Dr. Miller. Staff at the doctor’s office directed the patient to come to the [facility] ED, and the informant drove him.     The patient has been depressed, with ruminations over a job decision he has decided was a mistake. He has been obsessively contacting an employer, so that they have sent a “cease and desist order.” Former friends have unfriended him over his obsessiveness. He has been staying in bed much of the day, sleeping excessively, revealing he has been taking otc sleep medications. He has required encouragement to eat and exercise. Today he verbalized suicidal ideation of overdosing on pills to his friend, or looking up a method on the computer. He told the informant he might as well kill himself. He has never engaged in self-injurious behavior, and does not have access to a gun. He has been disorganized, staring and not responding to requests made by family members.  He had been working as a , and had 2 accidents in 2 weeks, most recently 6 days ago. After the second accident, he went into a rage, throwing a chair and fan, but has never been violent toward others, nor verbalized thoughts of such behavior. He has been spending excessively, and expressing grandiose ideas, saying it is a “loss for [place]” that he did not get the job he is obsessing over. He is speaking with pressured speech. Patient is prescribed risperidone, Lexapro and bupropion, but family members do not know whether he is compliant. Yesterday Dr. Miller raised the Risperdone from 2mg to 3 mg. The patient has been eating well and tending to hygiene. He has no substance abuse history. There has been no evidence of hallucinations.      A bed was obtained on [address]. Writer informed patient’s mother, providing information about the unit.

## 2017-08-17 NOTE — ED BEHAVIORAL HEALTH ASSESSMENT NOTE - OTHER
mother mildly disorganized recent history of auditory hallucination however denying current auditory hallucination

## 2017-08-17 NOTE — ED ADULT TRIAGE NOTE - CHIEF COMPLAINT QUOTE
Pt c/o suicidal thoughts, denies plan. States "i'm embarrased about myself and my job." Hx depression, compliant with med regimen. Denies HI/substance abuse/ETOH.

## 2017-08-17 NOTE — ED BEHAVIORAL HEALTH ASSESSMENT NOTE - HPI (INCLUDE ILLNESS QUALITY, SEVERITY, DURATION, TIMING, CONTEXT, MODIFYING FACTORS, ASSOCIATED SIGNS AND SYMPTOMS)
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 Miller, hospitalized in July 2017 for psychotic depression, no prior suicide attempts, no violence hx, no legal problems, history of substance abuse in past, recently denies any ETOH use, denies DTs history, BIB mother for evaluation of depression & suicidality.     In ED, patient is a limited historian, rambling, anxious & states he is feeling very hopeless & overwhelmed. Patient is observed pacing, restless & agitated & is given Ativan 1mg p.o. which has fair effect. He reports that he has been ruminating about a job opportunity that was lost & states he cannot move past this "huge mistake" that he made. He states multiple times that he has been considering overdosing on medication, ingesting poison or shooting himself. He states he wishes he could disappear & states he feels he cannot move forward in his life. He reports that he has been feeling depressed & anhedonic & more impulsive recently. He reports he is taking his medications, but has been very anxious regardless. He denies hallucinations & no overt delusions are elicited. He denies any manic sx, denies HI.     Dr. Miller reports patient has been depressed and has spoken about suicidality recently, but not with any specific intent nor plan. He states if patient is seeking a voluntary admission, he is supportive of pt's choice.  Lina Long spoke to pt's mother, who reports that patient has been increasingly depressed, making suicidal statements, stating he wants to overdose on pills & talking about wanting to be dead. She reported patient has recently been noted to be more disorganized & delusional. See  note for full collateral.

## 2017-08-17 NOTE — ED BEHAVIORAL HEALTH ASSESSMENT NOTE - PSYCHIATRIC ISSUES AND PLAN (INCLUDE STANDING AND PRN MEDICATION)
Risperdal 3mg q HS for psychosis (was increased on 8/15 from 2mg to 3mg),  Continue Wellbutrin  mg for depression, Remeron 30 mg QHS. Stop Xanax 0.25mg BID & give Klonopin 1mg q HS for agitation. Stop Adderall XR 20mg as there is no indication patient has ADHD/may be contributing to worsening of sx. Ativan 2mg q 6 hours PRN for agitation/IM or p.o.

## 2017-08-17 NOTE — ED BEHAVIORAL HEALTH ASSESSMENT NOTE - RISK ASSESSMENT
Risk factors include depressed mood, psychotic symptoms, history of recent discharge from a psychiatric facility, substance abuse, impulsivity, impaired insight & judgement, current suicidal ideation with plan and unclear intent. This patient is at high risk for harm and requires inpatient level of care for safety and stabilization.

## 2017-08-17 NOTE — ED BEHAVIORAL HEALTH ASSESSMENT NOTE - CURRENT MEDICATION
Risperdal 3mg q HS,  Wellbutrin  mg for depression, Remeron 30 mg QHS  [facility] indicates patient received a 30 day supply of Adderall XR 20mg qd & Xanax 0.25mg BID from [facility]

## 2017-08-18 PROCEDURE — 99222 1ST HOSP IP/OBS MODERATE 55: CPT | Mod: GC

## 2017-08-18 RX ORDER — CHLORPROMAZINE HCL 10 MG
50 TABLET ORAL ONCE
Qty: 0 | Refills: 0 | Status: DISCONTINUED | OUTPATIENT
Start: 2017-08-18 | End: 2017-08-25

## 2017-08-18 RX ORDER — HYDROXYZINE HCL 10 MG
25 TABLET ORAL EVERY 6 HOURS
Qty: 0 | Refills: 0 | Status: DISCONTINUED | OUTPATIENT
Start: 2017-08-18 | End: 2017-08-25

## 2017-08-18 RX ORDER — BUPROPION HYDROCHLORIDE 150 MG/1
450 TABLET, EXTENDED RELEASE ORAL DAILY
Qty: 0 | Refills: 0 | Status: DISCONTINUED | OUTPATIENT
Start: 2017-08-18 | End: 2017-08-25

## 2017-08-18 RX ADMIN — MIRTAZAPINE 30 MILLIGRAM(S): 45 TABLET, ORALLY DISINTEGRATING ORAL at 21:11

## 2017-08-19 PROCEDURE — 99232 SBSQ HOSP IP/OBS MODERATE 35: CPT

## 2017-08-19 RX ADMIN — MIRTAZAPINE 30 MILLIGRAM(S): 45 TABLET, ORALLY DISINTEGRATING ORAL at 21:20

## 2017-08-19 RX ADMIN — Medication 25 MILLIGRAM(S): at 18:55

## 2017-08-19 RX ADMIN — BUPROPION HYDROCHLORIDE 450 MILLIGRAM(S): 150 TABLET, EXTENDED RELEASE ORAL at 10:02

## 2017-08-20 PROCEDURE — 99232 SBSQ HOSP IP/OBS MODERATE 35: CPT

## 2017-08-20 RX ADMIN — MIRTAZAPINE 30 MILLIGRAM(S): 45 TABLET, ORALLY DISINTEGRATING ORAL at 20:58

## 2017-08-20 RX ADMIN — BUPROPION HYDROCHLORIDE 450 MILLIGRAM(S): 150 TABLET, EXTENDED RELEASE ORAL at 09:50

## 2017-08-21 PROCEDURE — 99232 SBSQ HOSP IP/OBS MODERATE 35: CPT | Mod: GC

## 2017-08-21 RX ADMIN — BUPROPION HYDROCHLORIDE 450 MILLIGRAM(S): 150 TABLET, EXTENDED RELEASE ORAL at 09:58

## 2017-08-21 RX ADMIN — MIRTAZAPINE 30 MILLIGRAM(S): 45 TABLET, ORALLY DISINTEGRATING ORAL at 21:20

## 2017-08-22 PROBLEM — F32.9 MAJOR DEPRESSIVE DISORDER, SINGLE EPISODE, UNSPECIFIED: Chronic | Status: ACTIVE | Noted: 2017-08-17

## 2017-08-22 PROCEDURE — 99232 SBSQ HOSP IP/OBS MODERATE 35: CPT | Mod: GC

## 2017-08-22 RX ADMIN — BUPROPION HYDROCHLORIDE 450 MILLIGRAM(S): 150 TABLET, EXTENDED RELEASE ORAL at 08:49

## 2017-08-22 RX ADMIN — Medication 25 MILLIGRAM(S): at 14:02

## 2017-08-22 RX ADMIN — MIRTAZAPINE 30 MILLIGRAM(S): 45 TABLET, ORALLY DISINTEGRATING ORAL at 22:33

## 2017-08-23 PROCEDURE — 99232 SBSQ HOSP IP/OBS MODERATE 35: CPT | Mod: GC

## 2017-08-23 RX ADMIN — BUPROPION HYDROCHLORIDE 450 MILLIGRAM(S): 150 TABLET, EXTENDED RELEASE ORAL at 08:26

## 2017-08-23 RX ADMIN — Medication 25 MILLIGRAM(S): at 15:44

## 2017-08-23 RX ADMIN — MIRTAZAPINE 30 MILLIGRAM(S): 45 TABLET, ORALLY DISINTEGRATING ORAL at 21:43

## 2017-08-24 PROCEDURE — 99232 SBSQ HOSP IP/OBS MODERATE 35: CPT | Mod: GC

## 2017-08-24 RX ORDER — ALPRAZOLAM 0.25 MG
1 TABLET ORAL
Qty: 0 | Refills: 0 | COMMUNITY

## 2017-08-24 RX ORDER — BUPROPION HYDROCHLORIDE 150 MG/1
1 TABLET, EXTENDED RELEASE ORAL
Qty: 30 | Refills: 0 | OUTPATIENT
Start: 2017-08-24 | End: 2017-09-23

## 2017-08-24 RX ORDER — MIRTAZAPINE 45 MG/1
1 TABLET, ORALLY DISINTEGRATING ORAL
Qty: 30 | Refills: 0 | OUTPATIENT
Start: 2017-08-24 | End: 2017-09-23

## 2017-08-24 RX ADMIN — Medication 25 MILLIGRAM(S): at 12:35

## 2017-08-24 RX ADMIN — BUPROPION HYDROCHLORIDE 450 MILLIGRAM(S): 150 TABLET, EXTENDED RELEASE ORAL at 08:22

## 2017-08-24 RX ADMIN — MIRTAZAPINE 30 MILLIGRAM(S): 45 TABLET, ORALLY DISINTEGRATING ORAL at 21:10

## 2017-08-25 VITALS — TEMPERATURE: 97 F

## 2017-08-25 PROCEDURE — 99239 HOSP IP/OBS DSCHRG MGMT >30: CPT | Mod: GC

## 2017-08-25 RX ORDER — HYDROXYZINE HCL 10 MG
1 TABLET ORAL
Qty: 45 | Refills: 0 | OUTPATIENT
Start: 2017-08-25 | End: 2017-09-09

## 2017-08-25 RX ADMIN — Medication 25 MILLIGRAM(S): at 09:33

## 2017-08-25 RX ADMIN — BUPROPION HYDROCHLORIDE 450 MILLIGRAM(S): 150 TABLET, EXTENDED RELEASE ORAL at 09:22

## 2017-08-28 ENCOUNTER — OUTPATIENT (OUTPATIENT)
Dept: OUTPATIENT SERVICES | Facility: HOSPITAL | Age: 27
LOS: 1 days | Discharge: HOME | End: 2017-08-28

## 2017-09-07 DIAGNOSIS — F33.3 MAJOR DEPRESSIVE DISORDER, RECURRENT, SEVERE WITH PSYCHOTIC SYMPTOMS: ICD-10-CM

## 2017-09-20 LAB
ALBUMIN SERPL ELPH-MCNC: 4.5 G/DL — SIGNIFICANT CHANGE UP (ref 3.3–5)
ALP SERPL-CCNC: 46 U/L — SIGNIFICANT CHANGE UP (ref 40–120)
ALT FLD-CCNC: 13 U/L — SIGNIFICANT CHANGE UP (ref 4–41)
AMORPH CRY # UR COMP ASSIST: SIGNIFICANT CHANGE UP (ref 0–0)
AMPHET UR-MCNC: NEGATIVE — SIGNIFICANT CHANGE UP
APPEARANCE UR: SIGNIFICANT CHANGE UP
AST SERPL-CCNC: 16 U/L — SIGNIFICANT CHANGE UP (ref 4–40)
BACTERIA # UR AUTO: SIGNIFICANT CHANGE UP
BARBITURATES UR SCN-MCNC: NEGATIVE — SIGNIFICANT CHANGE UP
BASOPHILS # BLD AUTO: 0.06 K/UL — SIGNIFICANT CHANGE UP (ref 0–0.2)
BASOPHILS NFR BLD AUTO: 0.8 % — SIGNIFICANT CHANGE UP (ref 0–2)
BENZODIAZ UR-MCNC: NEGATIVE — SIGNIFICANT CHANGE UP
BILIRUB SERPL-MCNC: 0.4 MG/DL — SIGNIFICANT CHANGE UP (ref 0.2–1.2)
BILIRUB UR-MCNC: NEGATIVE — SIGNIFICANT CHANGE UP
BLOOD UR QL VISUAL: NEGATIVE — SIGNIFICANT CHANGE UP
BUN SERPL-MCNC: 12 MG/DL — SIGNIFICANT CHANGE UP (ref 7–23)
CALCIUM SERPL-MCNC: 9.5 MG/DL — SIGNIFICANT CHANGE UP (ref 8.4–10.5)
CANNABINOIDS UR-MCNC: NEGATIVE — SIGNIFICANT CHANGE UP
CHLORIDE SERPL-SCNC: 104 MMOL/L — SIGNIFICANT CHANGE UP (ref 98–107)
CO2 SERPL-SCNC: 26 MMOL/L — SIGNIFICANT CHANGE UP (ref 22–31)
COCAINE METAB.OTHER UR-MCNC: NEGATIVE — SIGNIFICANT CHANGE UP
COLOR SPEC: YELLOW — SIGNIFICANT CHANGE UP
CREAT SERPL-MCNC: 1.18 MG/DL — SIGNIFICANT CHANGE UP (ref 0.5–1.3)
EOSINOPHIL # BLD AUTO: 0.16 K/UL — SIGNIFICANT CHANGE UP (ref 0–0.5)
EOSINOPHIL NFR BLD AUTO: 2 % — SIGNIFICANT CHANGE UP (ref 0–6)
GLUCOSE SERPL-MCNC: 80 MG/DL — SIGNIFICANT CHANGE UP (ref 70–99)
GLUCOSE UR-MCNC: NEGATIVE — SIGNIFICANT CHANGE UP
HBA1C BLD-MCNC: 5.5 % — SIGNIFICANT CHANGE UP (ref 4–5.6)
HCT VFR BLD CALC: 47 % — SIGNIFICANT CHANGE UP (ref 39–50)
HGB BLD-MCNC: 15.7 G/DL — SIGNIFICANT CHANGE UP (ref 13–17)
IMM GRANULOCYTES # BLD AUTO: 0.03 # — SIGNIFICANT CHANGE UP
IMM GRANULOCYTES NFR BLD AUTO: 0.4 % — SIGNIFICANT CHANGE UP (ref 0–1.5)
KETONES UR-MCNC: NEGATIVE — SIGNIFICANT CHANGE UP
LEUKOCYTE ESTERASE UR-ACNC: SIGNIFICANT CHANGE UP
LYMPHOCYTES # BLD AUTO: 1.95 K/UL — SIGNIFICANT CHANGE UP (ref 1–3.3)
LYMPHOCYTES # BLD AUTO: 24.7 % — SIGNIFICANT CHANGE UP (ref 13–44)
MCHC RBC-ENTMCNC: 29.2 PG — SIGNIFICANT CHANGE UP (ref 27–34)
MCHC RBC-ENTMCNC: 33.4 % — SIGNIFICANT CHANGE UP (ref 32–36)
MCV RBC AUTO: 87.5 FL — SIGNIFICANT CHANGE UP (ref 80–100)
METHADONE UR-MCNC: NEGATIVE — SIGNIFICANT CHANGE UP
MONOCYTES # BLD AUTO: 0.66 K/UL — SIGNIFICANT CHANGE UP (ref 0–0.9)
MONOCYTES NFR BLD AUTO: 8.3 % — SIGNIFICANT CHANGE UP (ref 2–14)
MUCOUS THREADS # UR AUTO: SIGNIFICANT CHANGE UP
NEUTROPHILS # BLD AUTO: 5.05 K/UL — SIGNIFICANT CHANGE UP (ref 1.8–7.4)
NEUTROPHILS NFR BLD AUTO: 63.8 % — SIGNIFICANT CHANGE UP (ref 43–77)
NITRITE UR-MCNC: NEGATIVE — SIGNIFICANT CHANGE UP
NRBC # FLD: 0 — SIGNIFICANT CHANGE UP
OPIATES UR-MCNC: POSITIVE — SIGNIFICANT CHANGE UP
OXYCODONE UR-MCNC: NEGATIVE — SIGNIFICANT CHANGE UP
PCP UR-MCNC: NEGATIVE — SIGNIFICANT CHANGE UP
PH UR: 8 — SIGNIFICANT CHANGE UP (ref 4.6–8)
PLATELET # BLD AUTO: 205 K/UL — SIGNIFICANT CHANGE UP (ref 150–400)
PMV BLD: 10.6 FL — SIGNIFICANT CHANGE UP (ref 7–13)
POTASSIUM SERPL-MCNC: 4.4 MMOL/L — SIGNIFICANT CHANGE UP (ref 3.5–5.3)
POTASSIUM SERPL-SCNC: 4.4 MMOL/L — SIGNIFICANT CHANGE UP (ref 3.5–5.3)
PROT SERPL-MCNC: 7.5 G/DL — SIGNIFICANT CHANGE UP (ref 6–8.3)
PROT UR-MCNC: 10 — SIGNIFICANT CHANGE UP
RBC # BLD: 5.37 M/UL — SIGNIFICANT CHANGE UP (ref 4.2–5.8)
RBC # FLD: 12 % — SIGNIFICANT CHANGE UP (ref 10.3–14.5)
RBC CASTS # UR COMP ASSIST: SIGNIFICANT CHANGE UP (ref 0–?)
SODIUM SERPL-SCNC: 140 MMOL/L — SIGNIFICANT CHANGE UP (ref 135–145)
SP GR SPEC: 1.02 — SIGNIFICANT CHANGE UP (ref 1–1.03)
UROBILINOGEN FLD QL: NORMAL E.U. — SIGNIFICANT CHANGE UP (ref 0.1–0.2)
WBC # BLD: 7.91 K/UL — SIGNIFICANT CHANGE UP (ref 3.8–10.5)
WBC # FLD AUTO: 7.91 K/UL — SIGNIFICANT CHANGE UP (ref 3.8–10.5)

## 2017-10-11 ENCOUNTER — OUTPATIENT (OUTPATIENT)
Dept: OUTPATIENT SERVICES | Facility: HOSPITAL | Age: 27
LOS: 1 days | Discharge: HOME | End: 2017-10-11

## 2017-10-12 DIAGNOSIS — F33.9 MAJOR DEPRESSIVE DISORDER, RECURRENT, UNSPECIFIED: ICD-10-CM
